# Patient Record
Sex: MALE | Race: BLACK OR AFRICAN AMERICAN
[De-identification: names, ages, dates, MRNs, and addresses within clinical notes are randomized per-mention and may not be internally consistent; named-entity substitution may affect disease eponyms.]

---

## 2022-01-25 DIAGNOSIS — M05.762 RHEUMATOID ARTHRITIS WITH RHEUMATOID FACTOR OF RIGHT KNEE W/OUT ORGAN OR SYSTEMS INVOLVEMENT: ICD-10-CM

## 2022-01-25 DIAGNOSIS — M66.0 RUPTURE OF POPLITEAL CYST: ICD-10-CM

## 2022-01-25 DIAGNOSIS — M05.761 RHEUMATOID ARTHRITIS WITH RHEUMATOID FACTOR OF RIGHT KNEE W/OUT ORGAN OR SYSTEMS INVOLVEMENT: ICD-10-CM

## 2022-01-25 DIAGNOSIS — R06.00 DYSPNEA, UNSPECIFIED: ICD-10-CM

## 2022-01-25 DIAGNOSIS — R60.9 EDEMA, UNSPECIFIED: ICD-10-CM

## 2022-01-25 PROBLEM — Z00.00 ENCOUNTER FOR PREVENTIVE HEALTH EXAMINATION: Status: ACTIVE | Noted: 2022-01-25

## 2022-07-27 ENCOUNTER — APPOINTMENT (OUTPATIENT)
Dept: ORTHOPEDIC SURGERY | Facility: CLINIC | Age: 57
End: 2022-07-27

## 2022-07-27 VITALS — HEIGHT: 64 IN | BODY MASS INDEX: 32.44 KG/M2 | WEIGHT: 190 LBS

## 2022-07-27 DIAGNOSIS — M19.012 PRIMARY OSTEOARTHRITIS, LEFT SHOULDER: ICD-10-CM

## 2022-07-27 DIAGNOSIS — M54.16 RADICULOPATHY, LUMBAR REGION: ICD-10-CM

## 2022-07-27 DIAGNOSIS — M25.512 PAIN IN LEFT SHOULDER: ICD-10-CM

## 2022-07-27 PROCEDURE — 73030 X-RAY EXAM OF SHOULDER: CPT | Mod: LT

## 2022-07-27 PROCEDURE — 99204 OFFICE O/P NEW MOD 45 MIN: CPT | Mod: 25

## 2022-07-27 PROCEDURE — 20610 DRAIN/INJ JOINT/BURSA W/O US: CPT | Mod: LT

## 2022-07-27 PROCEDURE — 72100 X-RAY EXAM L-S SPINE 2/3 VWS: CPT

## 2022-07-27 NOTE — DISCUSSION/SUMMARY
[de-identified] : Left glenohumeral osteoarthritis.  I discussed the treatment of degenerative arthritis with the patient at length today. I described the spectrum of treatment from nonoperative modalities to total joint arthroplasty. Noninvasive and nonoperative treatment modalities include weight reduction, activity modification with low impact exercise, PRN use of acetaminophen or anti-inflammatory medication if tolerated, glucosamine/chondroitin supplements, and physical therapy. Further treatments can include corticosteroid injection and the use of hyaluronic acid injections. Definitive treatment can certainly include total joint arthroplasty also. The risks and benefits of each treatment options was discussed and all questions were answered.\par \par Injection: Left glenohumeral joint.\par Indication: Osteoarthritis.\par \par A discussion was had with the patient regarding this procedure and all questions were answered. All risks, benefits and alternatives were discussed. These included but were not limited to bleeding, infection, and allergic reaction. Alcohol was used to clean the skin, and betadine was used to sterilize and prep the area in the posterior aspect of the left shoulder. Ethyl chloride spray was then used as a topical anesthetic. A 21-gauge needle was used to inject 7cc of 1% lidocaine and 1cc of 40mg/ml methylprednisolone into the left glenohumeral joint. A sterile bandage was then applied. The patient tolerated the procedure well and there were no complications. \par \par In terms of his back he states that he is supposed to get an MRI while he was at the correctional facility however it was not done before he was released.  He has longstanding significant radiculopathy we will obtain an MRI and refer to spine for further treatment options

## 2022-07-27 NOTE — PHYSICAL EXAM
[de-identified] : General Exam\par \par Well developed, well nourished\par No apparent distress\par Oriented to person, place, and time\par Mood: Normal\par Affect: Normal\par Balance and coordination: Normal\par Gait: Normal\par \par Left shoulder exam\par \par Inspection: No swelling, ecchymosis or gross deformity.\par Skin: No masses, No lesions\par Tenderness: No bicipital tenderness, no tenderness to the greater tuberosity/RTC insertion, no anterior shoulder/lesser tuberosity tenderness. No tenderness SC joint, clavicle, AC joint.\par ROM: 150/60/T6\par Impingement tests: Positive [facility]\par AC Joint: no pain with cross arm testing\par Biceps: Negative speed\par Strength: 5/5 abduction, external rotation, and internal rotation\par Neuro: AIN, PIN, Ulnar nerve motor intact\par Sensation: Intact to light touch in radial, median, ulnar, and axillary nerve distributions\par Vasc: 2+ radial pulse\par \par Lumbar Spine Exam\par \par Inspection: No lesions, no obvious deformity\par Palpation: No midline tenderness palpation, step-offs, or skin lesions. No tenderness to palpation of the sciatic notch bilaterally. Paraspinal tenderness bilaterally\par ROM: +restricted range of motion with respect to flexion, extension, lateral bending, and rotation. \par Strength: 5/5 IP/hip abductors/hip adductors/Q/H/EHL/TA/GS\par Hip ROM: No pain with passive internal/external rotation of the hips. \par Other tests:Positive straight leg raise on the left negative femoral stretch \par Sensation: Intact sensation to light touch bilateral lower extremities in L2-S1 distributions. \par Reflexes: 2+] patellar and reflexes. Downgoing\par Babinski. \par Pulses: 2+ DP and PT pulses. [de-identified] : \par The following radiographs were ordered and read by me during this patients visit. I reviewed each radiograph in detail with the patient and discussed the findings as highlighted below. \par \par 3 views of the left shoulder were obtained today.  Severe glenohumeral arthrosis joint space narrowing osteophyte sclerosis and cystic changes\par \par AP and lateral of the lumbar spine were obtained today severe multilevel spondylosis with disc space narrowing

## 2022-07-27 NOTE — DISCUSSION/SUMMARY
[de-identified] : Left glenohumeral osteoarthritis.  I discussed the treatment of degenerative arthritis with the patient at length today. I described the spectrum of treatment from nonoperative modalities to total joint arthroplasty. Noninvasive and nonoperative treatment modalities include weight reduction, activity modification with low impact exercise, PRN use of acetaminophen or anti-inflammatory medication if tolerated, glucosamine/chondroitin supplements, and physical therapy. Further treatments can include corticosteroid injection and the use of hyaluronic acid injections. Definitive treatment can certainly include total joint arthroplasty also. The risks and benefits of each treatment options was discussed and all questions were answered.\par \par Injection: Left glenohumeral joint.\par Indication: Osteoarthritis.\par \par A discussion was had with the patient regarding this procedure and all questions were answered. All risks, benefits and alternatives were discussed. These included but were not limited to bleeding, infection, and allergic reaction. Alcohol was used to clean the skin, and betadine was used to sterilize and prep the area in the posterior aspect of the left shoulder. Ethyl chloride spray was then used as a topical anesthetic. A 21-gauge needle was used to inject 7cc of 1% lidocaine and 1cc of 40mg/ml methylprednisolone into the left glenohumeral joint. A sterile bandage was then applied. The patient tolerated the procedure well and there were no complications. \par \par In terms of his back he states that he is supposed to get an MRI while he was at the correctional facility however it was not done before he was released.  He has longstanding significant radiculopathy we will obtain an MRI and refer to spine for further treatment options

## 2022-07-27 NOTE — HISTORY OF PRESENT ILLNESS
[de-identified] : 55yo male presents complaining of left shoulder and left leg pain for several years.  He reports stiffness and pain throughout his left shoulder.  Worse with overhead activity.  He reports pain down his left leg from his hip area.  This goes all the way to his foot with numbness and tingling.  He states this is chronic.  He tried minimum 6 weeks PT exercises, anti-inflammatory medications with no relief.\par \par The patient's past medical history, past surgical history, medications, allergies, and social history were reviewed by me today with the patient and documented accordingly. In addition, the patient's family history, which is noncontributory to this visit, was also reviewed.\par

## 2022-07-27 NOTE — HISTORY OF PRESENT ILLNESS
[de-identified] : 55yo male presents complaining of left shoulder and left leg pain for several years.  He reports stiffness and pain throughout his left shoulder.  Worse with overhead activity.  He reports pain down his left leg from his hip area.  This goes all the way to his foot with numbness and tingling.  He states this is chronic.  He tried minimum 6 weeks PT exercises, anti-inflammatory medications with no relief.\par \par The patient's past medical history, past surgical history, medications, allergies, and social history were reviewed by me today with the patient and documented accordingly. In addition, the patient's family history, which is noncontributory to this visit, was also reviewed.\par

## 2022-07-27 NOTE — PHYSICAL EXAM
[de-identified] : General Exam\par \par Well developed, well nourished\par No apparent distress\par Oriented to person, place, and time\par Mood: Normal\par Affect: Normal\par Balance and coordination: Normal\par Gait: Normal\par \par Left shoulder exam\par \par Inspection: No swelling, ecchymosis or gross deformity.\par Skin: No masses, No lesions\par Tenderness: No bicipital tenderness, no tenderness to the greater tuberosity/RTC insertion, no anterior shoulder/lesser tuberosity tenderness. No tenderness SC joint, clavicle, AC joint.\par ROM: 150/60/T6\par Impingement tests: Positive [facility]\par AC Joint: no pain with cross arm testing\par Biceps: Negative speed\par Strength: 5/5 abduction, external rotation, and internal rotation\par Neuro: AIN, PIN, Ulnar nerve motor intact\par Sensation: Intact to light touch in radial, median, ulnar, and axillary nerve distributions\par Vasc: 2+ radial pulse\par \par Lumbar Spine Exam\par \par Inspection: No lesions, no obvious deformity\par Palpation: No midline tenderness palpation, step-offs, or skin lesions. No tenderness to palpation of the sciatic notch bilaterally. Paraspinal tenderness bilaterally\par ROM: +restricted range of motion with respect to flexion, extension, lateral bending, and rotation. \par Strength: 5/5 IP/hip abductors/hip adductors/Q/H/EHL/TA/GS\par Hip ROM: No pain with passive internal/external rotation of the hips. \par Other tests:Positive straight leg raise on the left negative femoral stretch \par Sensation: Intact sensation to light touch bilateral lower extremities in L2-S1 distributions. \par Reflexes: 2+] patellar and reflexes. Downgoing\par Babinski. \par Pulses: 2+ DP and PT pulses. [de-identified] : \par The following radiographs were ordered and read by me during this patients visit. I reviewed each radiograph in detail with the patient and discussed the findings as highlighted below. \par \par 3 views of the left shoulder were obtained today.  Severe glenohumeral arthrosis joint space narrowing osteophyte sclerosis and cystic changes\par \par AP and lateral of the lumbar spine were obtained today severe multilevel spondylosis with disc space narrowing

## 2022-08-11 ENCOUNTER — OUTPATIENT (OUTPATIENT)
Dept: OUTPATIENT SERVICES | Facility: HOSPITAL | Age: 57
LOS: 1 days | End: 2022-08-11
Payer: MEDICAID

## 2022-08-11 ENCOUNTER — APPOINTMENT (OUTPATIENT)
Dept: MRI IMAGING | Facility: IMAGING CENTER | Age: 57
End: 2022-08-11

## 2022-08-11 ENCOUNTER — RESULT REVIEW (OUTPATIENT)
Age: 57
End: 2022-08-11

## 2022-08-11 DIAGNOSIS — M54.16 RADICULOPATHY, LUMBAR REGION: ICD-10-CM

## 2022-08-11 PROCEDURE — 72148 MRI LUMBAR SPINE W/O DYE: CPT

## 2022-08-11 PROCEDURE — 72148 MRI LUMBAR SPINE W/O DYE: CPT | Mod: 26

## 2022-08-18 ENCOUNTER — NON-APPOINTMENT (OUTPATIENT)
Age: 57
End: 2022-08-18

## 2022-08-22 ENCOUNTER — APPOINTMENT (OUTPATIENT)
Dept: ORTHOPEDIC SURGERY | Facility: CLINIC | Age: 57
End: 2022-08-22

## 2022-08-22 VITALS — WEIGHT: 190 LBS | HEIGHT: 64 IN | BODY MASS INDEX: 32.44 KG/M2

## 2022-08-22 DIAGNOSIS — M51.36 OTHER INTERVERTEBRAL DISC DEGENERATION, LUMBAR REGION: ICD-10-CM

## 2022-08-22 DIAGNOSIS — M54.16 RADICULOPATHY, LUMBAR REGION: ICD-10-CM

## 2022-08-22 DIAGNOSIS — M48.07 SPINAL STENOSIS, LUMBOSACRAL REGION: ICD-10-CM

## 2022-08-22 PROCEDURE — 99214 OFFICE O/P EST MOD 30 MIN: CPT

## 2022-08-22 NOTE — HISTORY OF PRESENT ILLNESS
[de-identified] : Mr. REBECA KEATING  is a 56 year old male who presents with left lumbar radiculopathy for 2-3 years.  He was incarcerated and did PT there.  He has low back pain and left lateral thigh and calf pain.  Normal bowel and bladder control.   Denies any recent fevers, chills, sweats, weight loss, or infection.  He also has a mild left footdrop.  He is not taking any pain medications. \par \par The patients past medical history, past surgical history, medications, allergies, and social history were reviewed by me today with the patient and documented accordingly.  In addition, the patient's family history, which is noncontributory to their visit, was also reviewed.\par

## 2023-03-02 ENCOUNTER — APPOINTMENT (OUTPATIENT)
Dept: ORTHOPEDIC SURGERY | Facility: CLINIC | Age: 58
End: 2023-03-02
Payer: MEDICAID

## 2023-03-02 VITALS
SYSTOLIC BLOOD PRESSURE: 143 MMHG | HEIGHT: 64 IN | HEART RATE: 86 BPM | RESPIRATION RATE: 17 BRPM | WEIGHT: 184 LBS | OXYGEN SATURATION: 100 % | DIASTOLIC BLOOD PRESSURE: 82 MMHG | BODY MASS INDEX: 31.41 KG/M2

## 2023-03-02 DIAGNOSIS — G89.29 PAIN IN RIGHT KNEE: ICD-10-CM

## 2023-03-02 DIAGNOSIS — M17.0 BILATERAL PRIMARY OSTEOARTHRITIS OF KNEE: ICD-10-CM

## 2023-03-02 DIAGNOSIS — M25.561 PAIN IN RIGHT KNEE: ICD-10-CM

## 2023-03-02 DIAGNOSIS — M25.562 PAIN IN RIGHT KNEE: ICD-10-CM

## 2023-03-02 PROCEDURE — 99204 OFFICE O/P NEW MOD 45 MIN: CPT

## 2023-03-02 PROCEDURE — 73564 X-RAY EXAM KNEE 4 OR MORE: CPT | Mod: 50

## 2023-03-02 RX ORDER — MELOXICAM 7.5 MG/1
7.5 TABLET ORAL DAILY
Qty: 15 | Refills: 2 | Status: ACTIVE | COMMUNITY
Start: 2023-03-02 | End: 1900-01-01

## 2023-03-02 NOTE — HISTORY OF PRESENT ILLNESS
[de-identified] : 57-year-old male presents with bilateral knee pain, right worse than left, which is severe in intensity.  Pain has been for at least 5 years, not associated with any specific injury.  The pain substantially limits activities of daily living. Walking tolerance is reduced.  The pain is increased with stairs and activity, improves with rest.  Medication including NSAIDs and activity modification have been minimally effective for a period lasting greater than three months in duration.  He reports constant swelling and clicking of the bilateral knees.  Denies buckling, locking, loss of motion.  Assistive devices and external support were not deemed by the patient to be helpful in improving their function.  He underwent right knee arthroscopy in 1996 for meniscus tear.  He had an injection to the right knee in approximately 2013 and has done physical therapy in the past prescribed by another physician which did not help.  His pain and restriction of function are intolerable at this time.  He reports moderate intermittent left back and buttock pain that radiates down his left lower extremity, associated with decreased sensation on the top of his foot and mild weakness.  He had a lumbar MRI in the past and has not had any treatment regarding his spine.  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

## 2023-03-02 NOTE — DISCUSSION/SUMMARY
[de-identified] : The patient has severe bilateral knee osteoarthritis. The patient has failed a course of conservative management and would like to proceed with a right total knee arthroplasty using robotic navigation for assistance.  He will consider left total knee arthroplasty following recovery from the right side.\par \par The patient is an appropriate candidate for consideration of right total knee replacement. An extensive discussion was conducted of the natural history of the disease and the variety of surgical and non-surgical treatment options available to the patient. A risk/benefit analysis was discussed with the patient reviewing the advantages and disadvantages of surgical intervention at this time. Both the level and length of the patient's pain have made additional conservative treatment measures consisting of NSAIDs, physical therapy, corticosteroids, and/or viscosupplementation contraindicated. A full explanation was given of the nature and the purpose of the procedure and anesthesia, its benefits, possible alternative methods of diagnosis or treatment, the risks involved, the possibility of complications, the foreseeable consequences of the procedure and the possible results of the non-treatment. I reviewed the plan of care as well as used a model of a total knee implant equivalent to the one that will be used for their total knee joint replacement. The ability to secure the implant utilizing cement or cementless (press-fit) was discussed with the patient. The patient agrees with the plan of care, as well as the use of implants for their total knee replacement. We also discussed that if robotic/computer navigation is utilized, then additional incisions may need to be made to accommodate the computer navigation arrays, which will be placed in the femur and tibia.\par No guarantee or assurance was made as to the results that may be obtained. Specifically, the risks were identified to include, but are not limited to the following: Infection, phlebitis, pulmonary embolism, death, paralysis, dislocation, pain, stiffness, instability, limp, weakness, breakage, leg-length inequality, uncontrolled bleeding, nerve injury, blood vessel injury, pressure sores, anesthetic risks, delayed healing of wound and bone, and wear and loosening. Further discussion was undertaken with the patient about the details of surgical preparation, treatment, and postoperative rehabilitation including medical clearance, autotransfusion, the hospital course, and the postoperative rehabilitation involved.\par \par He will need medical clearance preoperatively.  Prescription for meloxicam sent to his pharmacy.  Side effects of the medication reviewed.

## 2023-03-02 NOTE — PHYSICAL EXAM
[de-identified] : General: No acute distress\par Mental: Alert and oriented x3\par Eyes: Conjunctivitis not seen\par Chest: Symmetric chest rise, no audible wheezing\par Skin: Bilateral lower extremities absent from rashes and ulcers\par Abdomen: No distention\par \par Right knee:\par Skin: Clean, dry, intact \par Inspection: Mild varus malalignment, no masses, mild swelling, mild effusion. \par Tenderness: Positive MJLT. no LJLT. No tenderness over the medial and lateral patella facets. No ttp medial/lateral epicondyle, patella tendon, tibial tubercle, pes anserinus, or proximal fibula. \par ROM: 0 to 130° with crepitus\par Stability: Stable to varus and valgus, negative lachman. Negative anterior/posterior drawer. \par Additional tests: Negative McMurrays test, negative Steinmann, Negative patellar grind test.  \par Strength: 5/5 Q/H/TA/GS/EHL, no atrophy \par Neuro: Sensation intact to light touch throughout in dp/sp/tib/damaris/saph nerve distributions\par Pulses: 2+ DP/PT pulses.\par \par Left knee:\par Skin: Clean, dry, intact \par Inspection: Slight valgus malalignment, no masses, mild swelling, mild effusion. \par Tenderness: Positive MJLT. no LJLT. No tenderness over the medial and lateral patella facets. No ttp medial/lateral epicondyle, patella tendon, tibial tubercle, pes anserinus, or proximal fibula. \par ROM: 0 to 130° with crepitus\par Stability: Stable to varus and valgus, negative lachman. Negative anterior/posterior drawer. \par Additional tests: Negative McMurrays test, negative Steinmann, Negative patellar grind test.  \par Strength: 5/5 Q/H/GS, 4/5 TA/EHL, no atrophy \par Neuro: Sensation intact to light touch throughout in dp/sp/tib/damaris/saph nerve distributions\par Pulses: 2+ DP/PT pulses.\par \par Spine: Nontender at the midline lumbar and thoracic spine.  Nontender paraspinal muscles.  Negative straight leg raise bilaterally.\par 5/5 strength throughout the right lower extremity, 2/2 sensation throughout the right lower extremity\par 4/5 left foot dorsiflexion and EHL, otherwise 5/5 throughout\par 1/2 sensation left dorsal foot, otherwise 2/2 throughout [de-identified] : The following radiographs were ordered and read by me during this patients visit. I reviewed each radiograph in detail with the patient and discussed the findings as highlighted below.\par \par Left knee x-rays demonstrate valgus alignment, severe narrowing of the joint space worse laterally with subchondral sclerosis and diffuse osteophytes, patella at appropriate height and position.  Kellgren-Jesus 4\par \par Right knee x-rays demonstrate neutral alignment, severe narrowing of the joint space medially and laterally with subchondral sclerosis and diffuse osteophytes, patella at appropriate height and position.  Kellgren-Jesus 4

## 2024-02-28 NOTE — DISCUSSION/SUMMARY
Left detailed voice message letting patient know that writer has faxed over the order for orthotics to [facility] as requested.    [de-identified] : We reviewed his imaging.  We discussed further treatment options both nonsurgical and surgical.  He has tried physical therapy without relief.  We discussed surgery.  I explained this would likely entail lumbar decompression and fusion from L4-S1.  At this point he wishes to avoid surgical intervention.  He would like to be evaluated for possible epidural injections.  He will be referred for this.  Follow-up afterwards or sooner with any changes or worsening of his symptoms.

## 2025-03-03 ENCOUNTER — APPOINTMENT (OUTPATIENT)
Dept: ORTHOPEDIC SURGERY | Facility: CLINIC | Age: 60
End: 2025-03-03
Payer: COMMERCIAL

## 2025-03-03 VITALS — HEIGHT: 64 IN | BODY MASS INDEX: 29.02 KG/M2 | WEIGHT: 170 LBS

## 2025-03-03 DIAGNOSIS — Z78.9 OTHER SPECIFIED HEALTH STATUS: ICD-10-CM

## 2025-03-03 DIAGNOSIS — M17.0 BILATERAL PRIMARY OSTEOARTHRITIS OF KNEE: ICD-10-CM

## 2025-03-03 DIAGNOSIS — I10 ESSENTIAL (PRIMARY) HYPERTENSION: ICD-10-CM

## 2025-03-03 PROCEDURE — 20610 DRAIN/INJ JOINT/BURSA W/O US: CPT | Mod: 50

## 2025-03-03 PROCEDURE — 73562 X-RAY EXAM OF KNEE 3: CPT | Mod: 50

## 2025-03-03 PROCEDURE — 99204 OFFICE O/P NEW MOD 45 MIN: CPT | Mod: 25

## 2025-03-03 RX ORDER — LOSARTAN POTASSIUM 100 MG/1
TABLET, FILM COATED ORAL
Refills: 0 | Status: ACTIVE | COMMUNITY